# Patient Record
Sex: FEMALE
[De-identification: names, ages, dates, MRNs, and addresses within clinical notes are randomized per-mention and may not be internally consistent; named-entity substitution may affect disease eponyms.]

---

## 2017-09-05 ENCOUNTER — CHARTING TRANS (OUTPATIENT)
Dept: OTHER | Age: 22
End: 2017-09-05

## 2017-09-13 ENCOUNTER — CHARTING TRANS (OUTPATIENT)
Dept: OTHER | Age: 22
End: 2017-09-13

## 2018-01-10 ENCOUNTER — CHARTING TRANS (OUTPATIENT)
Dept: OTHER | Age: 23
End: 2018-01-10

## 2018-03-22 ENCOUNTER — OFFICE VISIT (OUTPATIENT)
Dept: FAMILY MEDICINE CLINIC | Facility: CLINIC | Age: 23
End: 2018-03-22

## 2018-03-22 VITALS
TEMPERATURE: 98 F | DIASTOLIC BLOOD PRESSURE: 76 MMHG | RESPIRATION RATE: 16 BRPM | BODY MASS INDEX: 37.52 KG/M2 | SYSTOLIC BLOOD PRESSURE: 112 MMHG | HEIGHT: 64 IN | WEIGHT: 219.81 LBS | HEART RATE: 80 BPM

## 2018-03-22 DIAGNOSIS — R63.5 WEIGHT GAIN, ABNORMAL: ICD-10-CM

## 2018-03-22 DIAGNOSIS — Z00.00 HEALTH MAINTENANCE EXAMINATION: ICD-10-CM

## 2018-03-22 DIAGNOSIS — J30.1 NON-SEASONAL ALLERGIC RHINITIS DUE TO POLLEN: Primary | ICD-10-CM

## 2018-03-22 PROBLEM — F41.9 ANXIETY: Status: ACTIVE | Noted: 2018-03-22

## 2018-03-22 PROCEDURE — 99385 PREV VISIT NEW AGE 18-39: CPT | Performed by: FAMILY MEDICINE

## 2018-03-22 RX ORDER — FLUOXETINE HYDROCHLORIDE 20 MG/1
20 CAPSULE ORAL DAILY
COMMUNITY
End: 2018-04-25

## 2018-03-22 NOTE — PROGRESS NOTES
[facility] [place]  PROGRESS NOTE        HPI:   This is a 20year old female coming in for Patient presents with:  Physical: New Patient    HPI:  Pt states that she is concerned with her mother being diagnosed with Hasimoto's disease and wanted List:     Anxiety     Non-seasonal allergic rhinitis due to pollen     Health maintenance examination      REVIEW OF SYSTEMS:   Review of Systems   Constitutional: Negative. HENT: Negative. Eyes: Negative. Respiratory: Negative.     Cardiovascular: thought content normal.   bdi: 7  bdi 19  phq9:  5       ASSESSMENT AND PLAN:   Artem Reno was seen today for physical.    Diagnoses and all orders for this visit:    Non-seasonal allergic rhinitis due to pollen  -     CK CREATINE KINASE (NOT CREATININE);  Fut Future  -     ADULT FOOD ALLERGY PROF; Future  -     ALLERGY REGION 8; Future          No problem-specific Assessment & Plan notes found for this encounter. future appointment:    No Follow-up on file.     Meds & Refills for this Visit:    No prescrip

## 2018-03-23 PROBLEM — Z87.42 HISTORY OF PCOS: Status: ACTIVE | Noted: 2018-03-23

## 2018-03-26 RX ORDER — FLUOXETINE 20 MG/1
TABLET, FILM COATED ORAL
Qty: 30 TABLET | Refills: 0 | Status: SHIPPED | OUTPATIENT
Start: 2018-03-26 | End: 2018-04-25

## 2018-03-26 NOTE — TELEPHONE ENCOUNTER
Future appt:     Your appointments     Date & Time Appointment Department [facility])    Mar 28, 2018  9:15 AM CDT Laboratory Visit with REF [facility] ([facility])        [facility]  [facility] [place]  [address] [facility]

## 2018-03-28 ENCOUNTER — LABORATORY ENCOUNTER (OUTPATIENT)
Dept: LAB | Age: 23
End: 2018-03-28
Attending: FAMILY MEDICINE
Payer: COMMERCIAL

## 2018-03-28 DIAGNOSIS — R63.5 WEIGHT GAIN, ABNORMAL: ICD-10-CM

## 2018-03-28 DIAGNOSIS — Z00.00 HEALTH MAINTENANCE EXAMINATION: ICD-10-CM

## 2018-03-28 DIAGNOSIS — J30.1 NON-SEASONAL ALLERGIC RHINITIS DUE TO POLLEN: ICD-10-CM

## 2018-03-28 LAB
25-HYDROXYVITAMIN D (TOTAL): 15.1 NG/ML (ref 30–100)
ALBUMIN SERPL-MCNC: 3.7 G/DL (ref 3.5–4.8)
ALP LIVER SERPL-CCNC: 61 U/L (ref 52–144)
ALT SERPL-CCNC: 26 U/L (ref 14–54)
ANTI-THYROGLOBULIN: 32 U/ML (ref ?–60)
ANTI-THYROPEROXIDASE: 29 U/ML (ref ?–60)
AST SERPL-CCNC: 17 U/L (ref 15–41)
BASOPHILS # BLD AUTO: 0.07 X10(3) UL (ref 0–0.1)
BASOPHILS NFR BLD AUTO: 0.7 %
BILIRUB SERPL-MCNC: 0.6 MG/DL (ref 0.1–2)
BILIRUB UR QL STRIP.AUTO: NEGATIVE
BUN BLD-MCNC: 17 MG/DL (ref 8–20)
CALCIUM BLD-MCNC: 9 MG/DL (ref 8.3–10.3)
CHLORIDE: 105 MMOL/L (ref 101–111)
CHOLEST SMN-MCNC: 167 MG/DL (ref ?–190)
CK: 82 IU/L (ref 26–192)
CLARITY UR REFRACT.AUTO: CLEAR
CO2: 27 MMOL/L (ref 22–32)
COLOR UR AUTO: YELLOW
CREAT BLD-MCNC: 0.83 MG/DL (ref 0.55–1.02)
DEPRECATED HBV CORE AB SER IA-ACNC: 56.5 NG/ML (ref 10–291)
EOSINOPHIL # BLD AUTO: 0.24 X10(3) UL (ref 0–0.3)
EOSINOPHIL NFR BLD AUTO: 2.4 %
ERYTHROCYTE [DISTWIDTH] IN BLOOD BY AUTOMATED COUNT: 11.6 % (ref 11.5–16)
GLUCOSE BLD-MCNC: 91 MG/DL (ref 70–99)
GLUCOSE UR STRIP.AUTO-MCNC: NEGATIVE MG/DL
HAV AB SERPL IA-ACNC: 769 PG/ML (ref 193–986)
HCT VFR BLD AUTO: 46.2 % (ref 34–50)
HDLC SERPL-MCNC: 44 MG/DL (ref 45–?)
HDLC SERPL: 3.8 {RATIO} (ref ?–4.44)
HGB BLD-MCNC: 16.1 G/DL (ref 12–16)
IMMATURE GRANULOCYTE COUNT: 0.03 X10(3) UL (ref 0–1)
IMMATURE GRANULOCYTE RATIO %: 0.3 %
IRON SATURATION: 43 % (ref 13–45)
IRON: 113 UG/DL (ref 28–170)
KETONES UR STRIP.AUTO-MCNC: NEGATIVE MG/DL
LDLC SERPL CALC-MCNC: 87 MG/DL (ref ?–120)
LEUKOCYTE ESTERASE UR QL STRIP.AUTO: NEGATIVE
LYMPHOCYTES # BLD AUTO: 2.35 X10(3) UL (ref 0.9–4)
LYMPHOCYTES NFR BLD AUTO: 23.8 %
M PROTEIN MFR SERPL ELPH: 7.4 G/DL (ref 6.1–8.3)
MCH RBC QN AUTO: 31 PG (ref 27–33.2)
MCHC RBC AUTO-ENTMCNC: 34.8 G/DL (ref 31–37)
MCV RBC AUTO: 88.8 FL (ref 81–100)
MONOCYTES # BLD AUTO: 0.67 X10(3) UL (ref 0.1–1)
MONOCYTES NFR BLD AUTO: 6.8 %
NEUTROPHIL ABS PRELIM: 6.5 X10 (3) UL (ref 1.3–6.7)
NEUTROPHILS # BLD AUTO: 6.5 X10(3) UL (ref 1.3–6.7)
NEUTROPHILS NFR BLD AUTO: 66 %
NITRITE UR QL STRIP.AUTO: NEGATIVE
NONHDLC SERPL-MCNC: 123 MG/DL (ref ?–150)
PH UR STRIP.AUTO: 6 [PH] (ref 4.5–8)
PLATELET # BLD AUTO: 286 10(3)UL (ref 150–450)
POTASSIUM SERPL-SCNC: 4.1 MMOL/L (ref 3.6–5.1)
PROT UR STRIP.AUTO-MCNC: NEGATIVE MG/DL
RBC # BLD AUTO: 5.2 X10(6)UL (ref 3.8–5.1)
RBC UR QL AUTO: NEGATIVE
RED CELL DISTRIBUTION WIDTH-SD: 36.9 FL (ref 35.1–46.3)
SODIUM SERPL-SCNC: 136 MMOL/L (ref 136–144)
SP GR UR STRIP.AUTO: 1.02 (ref 1–1.03)
TOTAL IRON BINDING CAPACITY: 261 UG/DL (ref 298–536)
TRANSFERRIN: 175 MG/DL (ref 200–360)
TRIGL SERPL-MCNC: 180 MG/DL (ref ?–115)
TSI SER-ACNC: 1.57 MIU/ML (ref 0.35–5.5)
URIC ACID: 4.1 MG/DL (ref 2.4–8)
UROBILINOGEN UR STRIP.AUTO-MCNC: <2 MG/DL
VLDLC SERPL CALC-MCNC: 36 MG/DL (ref 5–40)
WBC # BLD AUTO: 9.9 X10(3) UL (ref 4–13)

## 2018-03-28 PROCEDURE — 83550 IRON BINDING TEST: CPT | Performed by: FAMILY MEDICINE

## 2018-03-28 PROCEDURE — 82728 ASSAY OF FERRITIN: CPT | Performed by: FAMILY MEDICINE

## 2018-03-28 PROCEDURE — 80061 LIPID PANEL: CPT | Performed by: FAMILY MEDICINE

## 2018-03-28 PROCEDURE — 86003 ALLG SPEC IGE CRUDE XTRC EA: CPT | Performed by: FAMILY MEDICINE

## 2018-03-28 PROCEDURE — 80050 GENERAL HEALTH PANEL: CPT | Performed by: FAMILY MEDICINE

## 2018-03-28 PROCEDURE — 86376 MICROSOMAL ANTIBODY EACH: CPT | Performed by: FAMILY MEDICINE

## 2018-03-28 PROCEDURE — 36415 COLL VENOUS BLD VENIPUNCTURE: CPT | Performed by: FAMILY MEDICINE

## 2018-03-28 PROCEDURE — 86800 THYROGLOBULIN ANTIBODY: CPT | Performed by: FAMILY MEDICINE

## 2018-03-28 PROCEDURE — 82550 ASSAY OF CK (CPK): CPT | Performed by: FAMILY MEDICINE

## 2018-03-28 PROCEDURE — 82607 VITAMIN B-12: CPT | Performed by: FAMILY MEDICINE

## 2018-03-28 PROCEDURE — 82785 ASSAY OF IGE: CPT | Performed by: FAMILY MEDICINE

## 2018-03-28 PROCEDURE — 81003 URINALYSIS AUTO W/O SCOPE: CPT | Performed by: FAMILY MEDICINE

## 2018-03-28 PROCEDURE — 84550 ASSAY OF BLOOD/URIC ACID: CPT | Performed by: FAMILY MEDICINE

## 2018-03-28 PROCEDURE — 82306 VITAMIN D 25 HYDROXY: CPT | Performed by: FAMILY MEDICINE

## 2018-03-28 PROCEDURE — 83540 ASSAY OF IRON: CPT | Performed by: FAMILY MEDICINE

## 2018-03-29 ENCOUNTER — TELEPHONE (OUTPATIENT)
Dept: FAMILY MEDICINE CLINIC | Facility: CLINIC | Age: 23
End: 2018-03-29

## 2018-03-29 DIAGNOSIS — E55.9 VITAMIN D DEFICIENCY: Primary | ICD-10-CM

## 2018-03-29 RX ORDER — ERGOCALCIFEROL 1.25 MG/1
50000 CAPSULE ORAL WEEKLY
Qty: 12 CAPSULE | Refills: 0 | Status: SHIPPED | OUTPATIENT
Start: 2018-03-29 | End: 2018-06-15

## 2018-03-29 NOTE — TELEPHONE ENCOUNTER
----- Message from Alexandre Alejandro MD sent at 3/29/2018  9:35 AM CDT -----  Vitamin D is very low,   Recommend 46200 units of vitamin D weekly x 12 weeks. Recheck vitamin D level in 3 months. Iron levels are ok.    Triglycerides are high,   Recommend inc

## 2018-03-29 NOTE — TELEPHONE ENCOUNTER
Patient notified of results and recommendations and expressed understanding.   Script to [employer] per patient request  Order in for recheck vitamin d level    Ron Rosenthal, 03/29/18, 10:59 AM

## 2018-03-31 ENCOUNTER — TELEPHONE (OUTPATIENT)
Dept: FAMILY MEDICINE CLINIC | Facility: CLINIC | Age: 23
End: 2018-03-31

## 2018-03-31 LAB
ALLERGEN,  IGE: <0.1 KU/L
ALLERGEN,  SHRIMP IGE: <0.1 KU/L
ALLERGEN,  TREE IGE: <0.1 KU/L
ALLERGEN, A.ALTERNATA(TENUIS): <0.1 KU/L
ALLERGEN, BERMUDA GRASS IGE: <0.1 KU/L
ALLERGEN, BOX ELDER/MAPLE IGE: <0.1 KU/L
ALLERGEN, CAT DANDER IGE: <0.1 KU/L
ALLERGEN, CLAM IGE: <0.1 KU/L
ALLERGEN, CODFISH: <0.1 KU/L
ALLERGEN, CORN IGE: <0.1 KU/L
ALLERGEN, COTTONWOOD IGE: <0.1 KU/L
ALLERGEN, D. FARINAE IGE: <0.1 KU/L
ALLERGEN, D.PTERONYSSINUS IGE: <0.1 KU/L
ALLERGEN, DOG DANDER IGE: <0.1 KU/L
ALLERGEN, EGG WHITE IGE: <0.1 KU/L
ALLERGEN, GERMAN COCKROACH IGE: <0.1 KU/L
ALLERGEN, HORMODENDRUM IGE: <0.1 KU/L
ALLERGEN, MARSH ELDER IGE: <0.1 KU/L
ALLERGEN, MILK (COW) IGE: <0.1 KU/L
ALLERGEN, MILK (COW) IGE: <0.1 KU/L
ALLERGEN, MOUNTAIN CEDAR IGE: <0.1 KU/L
ALLERGEN, MOUSE EPITHE IGE: <0.1 KU/L
ALLERGEN, MUCOR RACEMOSUS IGE: <0.1 KU/L
ALLERGEN, OAK TREE IGE: <0.1 KU/L
ALLERGEN, PEANUT IGE: <0.1 KU/L
ALLERGEN, PEANUT IGE: <0.1 KU/L
ALLERGEN, PECAN TREE IGE: <0.1 KU/L
ALLERGEN, PENICILLIUM NOTATUM: <0.1 KU/L
ALLERGEN, PIGWEED IGE: <0.1 KU/L
ALLERGEN, RUSSIAN THISTLE IGE: <0.1 KU/L
ALLERGEN, SCALLOP IGE: <0.1 KU/L
ALLERGEN, SHORT RAGWEED IGE: <0.1 KU/L
ALLERGEN, SOYBEAN IGE: <0.1 KU/L
ALLERGEN, TIMOTHY GRASS IGE: <0.1 KU/L
ALLERGEN, WALNUT TREE IGE: <0.1 KU/L
ALLERGEN, WALNUT/BLACK WALNUT: <0.1 KU/L
ALLERGEN, WHEAT IGE: <0.1 KU/L
ALLERGEN, WHITE ASH IGE: <0.1 KU/L
ALLERGEN, WHITE MULBERRY IGE: <0.1 KU/L
ALLERGEN,ASPERGILLUS FUMIGATUS: <0.1 KU/L
IMMUNOGLOBULIN E: 4 KU/L
IMMUNOGLOBULIN E: 4 KU/L

## 2018-03-31 NOTE — TELEPHONE ENCOUNTER
Patient notified of results and recommendations and expressed understanding.     Omari Rosenthal, 03/31/18, 9:56 AM

## 2018-03-31 NOTE — TELEPHONE ENCOUNTER
----- Message from John Hanley MD sent at 3/31/2018  7:52 AM CDT -----  Normal () results, please notify patient. Mychart message sent if possible as well.

## 2018-04-02 ENCOUNTER — PATIENT MESSAGE (OUTPATIENT)
Dept: FAMILY MEDICINE CLINIC | Facility: CLINIC | Age: 23
End: 2018-04-02

## 2018-04-03 NOTE — TELEPHONE ENCOUNTER
From: Iam Norton  To:  Tiana De La Paz MD  Sent: 4/2/2018 7:00 PM CDT  Subject: Prescription Question    I have a question about VITAMIN D, 25-HYDROXY resulted on 3/28/18, 5:28 PM.    I have the Rx D2 that is to be taken once weekly, should I also take

## 2018-04-24 ENCOUNTER — PATIENT MESSAGE (OUTPATIENT)
Dept: FAMILY MEDICINE CLINIC | Facility: CLINIC | Age: 23
End: 2018-04-24

## 2018-04-25 ENCOUNTER — OFFICE VISIT (OUTPATIENT)
Dept: FAMILY MEDICINE CLINIC | Facility: CLINIC | Age: 23
End: 2018-04-25

## 2018-04-25 VITALS
BODY MASS INDEX: 37.73 KG/M2 | WEIGHT: 221 LBS | RESPIRATION RATE: 16 BRPM | DIASTOLIC BLOOD PRESSURE: 84 MMHG | HEIGHT: 64 IN | HEART RATE: 84 BPM | SYSTOLIC BLOOD PRESSURE: 110 MMHG | TEMPERATURE: 98 F

## 2018-04-25 DIAGNOSIS — Z30.019 ENCOUNTER FOR FEMALE BIRTH CONTROL: Primary | ICD-10-CM

## 2018-04-25 PROCEDURE — 99214 OFFICE O/P EST MOD 30 MIN: CPT | Performed by: NURSE PRACTITIONER

## 2018-04-25 RX ORDER — NORGESTIMATE AND ETHINYL ESTRADIOL 7DAYSX3 28
1 KIT ORAL DAILY
Qty: 3 PACKAGE | Refills: 3 | Status: SHIPPED | OUTPATIENT
Start: 2018-04-25 | End: 2019-02-25 | Stop reason: ALTCHOICE

## 2018-04-25 RX ORDER — FLUOXETINE 20 MG/1
TABLET, FILM COATED ORAL
Qty: 90 TABLET | Refills: 1 | Status: SHIPPED | OUTPATIENT
Start: 2018-04-25 | End: 2018-12-17

## 2018-04-25 NOTE — PATIENT INSTRUCTIONS
Start ortho tricyclen on May 13th. Call if any problems or complications. Handout on birth control given. Recheck in September for pelvic exam and pap. Otherwise follow-up as needed.

## 2018-04-25 NOTE — PROGRESS NOTES
HPI:    Patient ID: Marcial Latif is a 20year old female. HPI    Patient is present with requests to start birth control. States that she is getting  August 15. Would like to start and be controlled on birth control prior to that.   She has n mass index is 37.93 kg/m². ASSESSMENT/PLAN:   Encounter for female birth control  (primary encounter diagnosis)    No orders of the defined types were placed in this encounter.       Meds This Visit:  Signed Prescriptions Disp Refills    Norgestim-E

## 2018-04-25 NOTE — TELEPHONE ENCOUNTER
Future appt:    Last Appointment:  Patient saw Christy Fish this morning    Cholesterol, Total (mg/dL)   Date Value   03/28/2018 167   ----------  HDL Cholesterol (mg/dL)   Date Value   03/28/2018 44 (L)   ----------  LDL Cholesterol (mg/dL)   Date Value   03/28/

## 2018-04-25 NOTE — TELEPHONE ENCOUNTER
From: Fabricio Tamez  To: Martir Gonzalez MD  Sent: 4/24/2018 9:13 PM CDT  Subject: Other    Hello Dr. James Laura,    I will be getting  in August of this year and I am currently not on birth control.  I would like to be on birth control before I get [facility]

## 2018-05-01 ENCOUNTER — PATIENT MESSAGE (OUTPATIENT)
Dept: FAMILY MEDICINE CLINIC | Facility: CLINIC | Age: 23
End: 2018-05-01

## 2018-05-02 NOTE — TELEPHONE ENCOUNTER
From: iNna Abdul  To: MARVEL Paez  Sent: 5/1/2018 4:28 PM CDT  Subject: Non-Urgent Medical Question    Hello,    I have a concerning rash on my back.  There's one spot about an inch above my bra strap that is feels very raw and it is very red

## 2018-07-03 ENCOUNTER — PATIENT MESSAGE (OUTPATIENT)
Dept: FAMILY MEDICINE CLINIC | Facility: CLINIC | Age: 23
End: 2018-07-03

## 2018-07-03 DIAGNOSIS — N94.6 DYSMENORRHEA: Primary | ICD-10-CM

## 2018-07-03 NOTE — TELEPHONE ENCOUNTER
From: Nina Abdul  To: Juana Price MD  Sent: 7/3/2018 9:18 AM CDT  Subject: Visit Susan Camilo,    It has been about 2 weeks since I finished my vitamin D prescription.  Do I need to make a follow-up appointment for the blood

## 2018-07-03 NOTE — TELEPHONE ENCOUNTER
Marek Maher 4/25/18 to discuss birth control  Patient saw Dr. Karla Muller on 3/22/18 for physical  Patient states at physical appt with Dr. Karla Muller it was discussed that she may have PCOS  Patient would like to know if any additional testing can be done to confirm [facility]

## 2018-07-27 ENCOUNTER — APPOINTMENT (OUTPATIENT)
Dept: LAB | Age: 23
End: 2018-07-27
Attending: FAMILY MEDICINE
Payer: COMMERCIAL

## 2018-07-27 DIAGNOSIS — N94.6 DYSMENORRHEA: ICD-10-CM

## 2018-07-27 DIAGNOSIS — E55.9 VITAMIN D DEFICIENCY: ICD-10-CM

## 2018-07-27 LAB
FSH SERPL-ACNC: 2.2 MIU/ML
LH: 2.8 MIU/ML
PROGESTERONE: 0.94 NG/ML
PROLACTIN: 12.3 NG/ML
VIT D+METAB SERPL-MCNC: 34.6 NG/ML (ref 30–100)

## 2018-07-27 PROCEDURE — 83002 ASSAY OF GONADOTROPIN (LH): CPT | Performed by: FAMILY MEDICINE

## 2018-07-27 PROCEDURE — 84402 ASSAY OF FREE TESTOSTERONE: CPT | Performed by: FAMILY MEDICINE

## 2018-07-27 PROCEDURE — 36415 COLL VENOUS BLD VENIPUNCTURE: CPT | Performed by: FAMILY MEDICINE

## 2018-07-27 PROCEDURE — 84403 ASSAY OF TOTAL TESTOSTERONE: CPT | Performed by: FAMILY MEDICINE

## 2018-07-27 PROCEDURE — 82306 VITAMIN D 25 HYDROXY: CPT | Performed by: FAMILY MEDICINE

## 2018-07-27 PROCEDURE — 82627 DEHYDROEPIANDROSTERONE: CPT | Performed by: FAMILY MEDICINE

## 2018-07-27 PROCEDURE — 84146 ASSAY OF PROLACTIN: CPT | Performed by: FAMILY MEDICINE

## 2018-07-27 PROCEDURE — 84144 ASSAY OF PROGESTERONE: CPT | Performed by: FAMILY MEDICINE

## 2018-07-27 PROCEDURE — 83001 ASSAY OF GONADOTROPIN (FSH): CPT | Performed by: FAMILY MEDICINE

## 2018-07-27 PROCEDURE — 82671 ASSAY OF ESTROGENS: CPT | Performed by: FAMILY MEDICINE

## 2018-07-29 LAB — DHEA SULFATE, SERUM: 200 UG/DL

## 2018-07-31 LAB
SEX HORMONE BINDING GLOBULIN: 188 NMOL/L
TESTOSTERONE -MS, BIOAVAILAB: 2.6 NG/DL
TESTOSTERONE, -MS/MS: 23 NG/DL
TESTOSTERONE, FREE -MS/MS: 1.1 PG/ML

## 2018-08-01 ENCOUNTER — TELEPHONE (OUTPATIENT)
Dept: FAMILY MEDICINE CLINIC | Facility: CLINIC | Age: 23
End: 2018-08-01

## 2018-08-01 LAB
ESTRADIOL BY TMS: 9.3 PG/ML
ESTROGENS TOTAL CALCULATION: 23 PG/ML
ESTRONE BY TMS: 13.7 PG/ML

## 2018-08-01 NOTE — TELEPHONE ENCOUNTER
----- Message from Bebo Vences MD sent at 8/1/2018 12:24 AM CDT -----  Hormone levels seem mostly normal.

## 2018-08-03 ENCOUNTER — OFFICE VISIT (OUTPATIENT)
Dept: FAMILY MEDICINE CLINIC | Facility: CLINIC | Age: 23
End: 2018-08-03
Payer: COMMERCIAL

## 2018-08-03 VITALS
HEIGHT: 64 IN | DIASTOLIC BLOOD PRESSURE: 72 MMHG | OXYGEN SATURATION: 98 % | BODY MASS INDEX: 38.48 KG/M2 | WEIGHT: 225.38 LBS | HEART RATE: 79 BPM | TEMPERATURE: 98 F | RESPIRATION RATE: 18 BRPM | SYSTOLIC BLOOD PRESSURE: 116 MMHG

## 2018-08-03 DIAGNOSIS — Z87.42 HISTORY OF PCOS: Primary | ICD-10-CM

## 2018-08-03 PROCEDURE — 99214 OFFICE O/P EST MOD 30 MIN: CPT | Performed by: FAMILY MEDICINE

## 2018-08-03 NOTE — PATIENT INSTRUCTIONS
History question of PCOS  -     US PELVIS EV (TRNS ABD AND ENDOVAG) ([id number]); Future   Follow up for evaluation of sleep. Schedule referral to weight loss clinic  as pt desires. Continue diet and exercise to weight loss.

## 2018-08-03 NOTE — PROGRESS NOTES
[facility] [place]  PROGRESS NOTE        HPI:   This is a 24year old female coming in for Patient presents with: Follow - Up: test results and pcos    HPI  Discussed the effect of suspected PCOS  On her life.   States the biggest problem at the and getting her doctorate. Read and videogames, hiking. Pt states her mother's side of the family has a history of PCOS, but unsure which members.        Smoking status: Never Smoker                                                              S /72 (BP Location: Left arm, Patient Position: Sitting, Cuff Size: adult)   Pulse 79   Temp 97.8 °F (36.6 °C) (Temporal)   Resp 18   Ht 64\"   Wt 225 lb 6.4 oz   LMP 07/31/2018   SpO2 98%   BMI 38.69 kg/m²  Estimated body mass index is 38.69 kg/m² a Aug 03, 2018  2:00 PM CDT Follow up Visit with Walter Veliz MD [address] [place] [address] ([facility])    Please arrive 15 minutes prior to your scheduled appointment.  Please also bring your Insurance card, Photo I

## 2018-08-28 ENCOUNTER — OFFICE VISIT (OUTPATIENT)
Dept: FAMILY MEDICINE CLINIC | Facility: CLINIC | Age: 23
End: 2018-08-28
Payer: COMMERCIAL

## 2018-08-28 VITALS
WEIGHT: 227 LBS | SYSTOLIC BLOOD PRESSURE: 118 MMHG | BODY MASS INDEX: 38.76 KG/M2 | HEART RATE: 80 BPM | HEIGHT: 64 IN | RESPIRATION RATE: 18 BRPM | TEMPERATURE: 98 F | DIASTOLIC BLOOD PRESSURE: 70 MMHG

## 2018-08-28 DIAGNOSIS — G47.50 PARASOMNIA: ICD-10-CM

## 2018-08-28 DIAGNOSIS — R44.2 HYPNAPOMPIC HALLUCINATIONS: Primary | ICD-10-CM

## 2018-08-28 DIAGNOSIS — F51.8 HYPNAGOGIC JERKS: ICD-10-CM

## 2018-08-28 DIAGNOSIS — G47.10 HYPERSOMNIA: ICD-10-CM

## 2018-08-28 DIAGNOSIS — R06.83 SNORING: ICD-10-CM

## 2018-08-28 PROCEDURE — 99214 OFFICE O/P EST MOD 30 MIN: CPT | Performed by: FAMILY MEDICINE

## 2018-08-28 NOTE — PROGRESS NOTES
[facility]  [facility]        HPI:   This is a 24year old female coming in for Patient presents with:  Consult: sleep       HPI:  Patient is here for an evaluation of her sleep Pt states she is tired, and she can tell that she falling asleep and has periods where she feels like she is falling and then feels as though she is startled by the fall but hasn't moved from her bed. Patient: Sleep review of systems today: see above, others reported negative, see form.      Patient is Thyroid disease Maternal Grandfather    • ibs [OTHER] Paternal Grandmother    • Ulcerative Colitis Paternal Grandfather      Allergies:    Amoxicillin             HIVES  Current Meds:    Current Outpatient Prescriptions:  Norgestim-Eth Estrad Triphasic (OR 118/70  08/03/18 : 116/72  04/25/18 : 110/84    Ideal body weight: 120 lb 9.5 oz  Adjusted ideal body weight: 163 lb 2.5 oz    Vital signs reviewed. Physical Exam   Constitutional: She is oriented to person, place, and time.  She appears well-developed and treatments as a result of today.        Melody Dalton MD  8/28/2018  2:00 PM

## 2018-08-28 NOTE — PATIENT INSTRUCTIONS
You have been scheduled for a sleep study at the [address] [place] [address]. You will receive a phone call from the Sleep lab to review forms and will receive forms to fill out in the mail.      You should have heard from our precert department within the next

## 2018-08-30 ENCOUNTER — TELEPHONE (OUTPATIENT)
Dept: FAMILY MEDICINE CLINIC | Facility: CLINIC | Age: 23
End: 2018-08-30

## 2018-08-30 ENCOUNTER — HOSPITAL ENCOUNTER (OUTPATIENT)
Dept: ULTRASOUND IMAGING | Age: 23
Discharge: HOME | End: 2018-08-30
Attending: FAMILY MEDICINE
Payer: COMMERCIAL

## 2018-08-30 DIAGNOSIS — Z87.42 HISTORY OF PCOS: ICD-10-CM

## 2018-08-30 PROCEDURE — 76856 US EXAM PELVIC COMPLETE: CPT | Performed by: FAMILY MEDICINE

## 2018-08-30 PROCEDURE — 76830 TRANSVAGINAL US NON-OB: CPT | Performed by: FAMILY MEDICINE

## 2018-08-30 NOTE — TELEPHONE ENCOUNTER
----- Message from Zora White MD sent at 8/30/2018  1:21 PM CDT -----  Pt with complex cyst in ovary. Recommend repeat US in 2 cycles. Recommend ibuprofen as needed for pain.

## 2018-08-30 NOTE — TELEPHONE ENCOUNTER
Patient informed of the below results and recommendations. Patient will c/b to schedule the u/s after 2 cycles. Please place order.

## 2018-09-07 ENCOUNTER — OFFICE VISIT (OUTPATIENT)
Dept: SLEEP CENTER | Age: 23
End: 2018-09-07
Attending: FAMILY MEDICINE
Payer: COMMERCIAL

## 2018-09-07 PROCEDURE — 95810 POLYSOM 6/> YRS 4/> PARAM: CPT

## 2018-09-14 ENCOUNTER — TELEPHONE (OUTPATIENT)
Dept: FAMILY MEDICINE CLINIC | Facility: CLINIC | Age: 23
End: 2018-09-14

## 2018-09-14 NOTE — PROCEDURES
[address] [address],[facility] [address]       Accredited by the [facility] of Sleep Medicine ([facility])    PATIENT'S NAME:        Master Ortiz  ATTENDING PHYSICIAN:   Latisha Mcmahon.  MD Priti  REFERRING PHYSICIAN:     PATIENT ACCOUNT #:     [id number] She experienced RERAs with a RDI of 4.9. She did have snoring, with a snoring index of 0.9. Her minimum oxygen saturation was 89.2. Her maximum oxygen saturation was 99.6. Her average oxygen saturation was 95.8%.       LIMB ACTIVITY:  There were 53 limb Patient Medical History: mood disorder, parasomnia, snoring, hypersomnia, migraines, ear infections, anxiety   Setup Time began:   8:30 pm  Setup Time ended:    9:00 pm  Lights Out:    9:36 pm  Respiratory Events: reras, hy Stage By Position     NREM REM Supine Non-Supine TOTAL   Sleep Time (min) 366.0 83.0 265.5 183.5 449.0   % Sleep Time 81.5% 18.5% 59.1% 40.9% 100.0%   Obstructive Apnea 3 1 4 - 4   Mixed Apnea - - - - -   Central Apnea - - - - -   All Apneas 3 1 4 - 4   Ob

## 2018-09-14 NOTE — TELEPHONE ENCOUNTER
Your appointments     Date & Time Appointment Department [facility])    Sep 17, 2018  4:30 PM CDT Sleep Follow Up with MARVEL Billy [address] [facility], [facility] ([facility])        [address] [facility]

## 2018-09-14 NOTE — TELEPHONE ENCOUNTER
----- Message from Chung Laguerre MD sent at 9/14/2018 11:56 AM CDT -----  Please update study flow sheet,   notify patient,   Have follow up scheduled.

## 2018-09-17 ENCOUNTER — OFFICE VISIT (OUTPATIENT)
Dept: FAMILY MEDICINE CLINIC | Facility: CLINIC | Age: 23
End: 2018-09-17
Payer: COMMERCIAL

## 2018-09-17 VITALS
DIASTOLIC BLOOD PRESSURE: 78 MMHG | HEIGHT: 64 IN | SYSTOLIC BLOOD PRESSURE: 122 MMHG | HEART RATE: 68 BPM | WEIGHT: 224.38 LBS | TEMPERATURE: 99 F | BODY MASS INDEX: 38.31 KG/M2 | RESPIRATION RATE: 20 BRPM

## 2018-09-17 DIAGNOSIS — R42 DIZZINESS: ICD-10-CM

## 2018-09-17 DIAGNOSIS — R06.83 SNORING: Primary | ICD-10-CM

## 2018-09-17 PROBLEM — G43.809 VESTIBULAR MIGRAINE: Status: ACTIVE | Noted: 2017-11-17

## 2018-09-17 PROCEDURE — 99213 OFFICE O/P EST LOW 20 MIN: CPT | Performed by: NURSE PRACTITIONER

## 2018-09-17 NOTE — PROGRESS NOTES
[facility]        HPI:   This is a 24year old female coming in for No chief complaint on file. HPI:   Patient is present to review her sleep study. States that she has seen the results online.  Reviewed the isabel Read and videogames, hiking. Pt states her mother's side of the family has a history of PCOS, but unsure which members. Social History    Tobacco Use      Smoking status: Never Smoker      Smokeless tobacco: Never Used    Alcohol use:  Mya Murphy Weight as of this encounter: 224 lb 6.4 oz. Neck in inches:       Wt Readings from Last 6 Encounters:  09/17/18 : 224 lb 6.4 oz  08/28/18 : 227 lb  08/03/18 : 225 lb 6.4 oz  04/25/18 : 221 lb  03/22/18 : 219 lb 12.8 oz    BP Readings from Last 3 Encount CPAP mask and humidifier  chamber changed every 6 month  with the Durable medical equipment provider.          Meds & Refills for this Visit:  Requested Prescriptions      No prescriptions requested or ordered in this encounter       Outcome: Parent verbali

## 2018-09-17 NOTE — PATIENT INSTRUCTIONS
Let us know if wants referral to dentist, Dr. Yasmin Ratliff, for an oral appliance. Given brochure on sleep hygiene. Take an antihistamine daily. Return to clinic if dizziness not improving. Otherwise follow-up as needed.

## 2018-11-02 VITALS
HEIGHT: 62 IN | DIASTOLIC BLOOD PRESSURE: 66 MMHG | BODY MASS INDEX: 39.38 KG/M2 | TEMPERATURE: 98.3 F | SYSTOLIC BLOOD PRESSURE: 108 MMHG | HEART RATE: 84 BPM | WEIGHT: 214 LBS

## 2018-11-03 VITALS — TEMPERATURE: 98 F

## 2018-11-03 VITALS — TEMPERATURE: 98.3 F

## 2018-11-29 ENCOUNTER — PATIENT MESSAGE (OUTPATIENT)
Dept: FAMILY MEDICINE CLINIC | Facility: CLINIC | Age: 23
End: 2018-11-29

## 2018-11-30 NOTE — TELEPHONE ENCOUNTER
From: Trevor Cosme  To: Alexander Miller MD  Sent: 11/29/2018 10:42 PM CST  Subject: Non-Urgent [place],    I don't know if my fluoxetine medication is very effective or I may be experiencing some of the negative side effects.  I

## 2018-12-05 NOTE — PATIENT INSTRUCTIONS
Stop the birth control. Aware that her menses may come early. Monitor mood. If improved, call in a few weeks. If not improved, schedule appointment to discuss more options. Patient in agreement with plan.

## 2018-12-17 RX ORDER — FLUOXETINE 20 MG/1
TABLET, FILM COATED ORAL
Qty: 90 TABLET | Refills: 1 | Status: SHIPPED | OUTPATIENT
Start: 2018-12-17

## 2018-12-17 NOTE — TELEPHONE ENCOUNTER
RF request from Esdras Burris for Fluoxetine 20 MG. Please advise.      Future appt:    Last Appointment:  12/4/2018; No f/u recommended    Cholesterol, Total (mg/dL)   Date Value   03/28/2018 167     HDL Cholesterol (mg/dL)   Date Value   03/28/2018 44 (

## 2019-01-11 ENCOUNTER — WALK IN (OUTPATIENT)
Dept: URGENT CARE | Age: 24
End: 2019-01-11

## 2019-01-11 VITALS
BODY MASS INDEX: 42.8 KG/M2 | DIASTOLIC BLOOD PRESSURE: 80 MMHG | OXYGEN SATURATION: 98 % | HEART RATE: 98 BPM | RESPIRATION RATE: 16 BRPM | SYSTOLIC BLOOD PRESSURE: 122 MMHG | HEIGHT: 62 IN | TEMPERATURE: 98.4 F | WEIGHT: 232.59 LBS

## 2019-01-11 DIAGNOSIS — H57.12 LEFT EYE PAIN: Primary | ICD-10-CM

## 2019-01-11 PROCEDURE — X1094 NO CHARGE VISIT: HCPCS | Performed by: NURSE PRACTITIONER

## 2019-01-11 ASSESSMENT — VISUAL ACUITY
OS_CC: 20/25
OD_CC: 20/25

## 2019-01-15 ENCOUNTER — PATIENT MESSAGE (OUTPATIENT)
Dept: FAMILY MEDICINE CLINIC | Facility: CLINIC | Age: 24
End: 2019-01-15

## 2019-01-16 NOTE — TELEPHONE ENCOUNTER
From: Isabella Mcarthur  To: IRIS Kirkpatrick  Sent: 1/15/2019 5:13 PM CST  Subject: Visit Follow-up Question    Hello,    This is just a follow-up to my visit on December 4, 2018. I think that going off the birth control has helped quite a lot.  I have

## 2019-02-25 ENCOUNTER — OFFICE VISIT (OUTPATIENT)
Dept: FAMILY MEDICINE CLINIC | Facility: CLINIC | Age: 24
End: 2019-02-25
Payer: COMMERCIAL

## 2019-02-25 ENCOUNTER — APPOINTMENT (OUTPATIENT)
Dept: LAB | Age: 24
End: 2019-02-25
Attending: NURSE PRACTITIONER
Payer: COMMERCIAL

## 2019-02-25 VITALS
OXYGEN SATURATION: 98 % | HEIGHT: 64 IN | WEIGHT: 230.63 LBS | BODY MASS INDEX: 39.37 KG/M2 | HEART RATE: 94 BPM | RESPIRATION RATE: 16 BRPM | TEMPERATURE: 98 F | SYSTOLIC BLOOD PRESSURE: 118 MMHG | DIASTOLIC BLOOD PRESSURE: 68 MMHG

## 2019-02-25 DIAGNOSIS — L24.9 IRRITANT CONTACT DERMATITIS, UNSPECIFIED TRIGGER: Primary | ICD-10-CM

## 2019-02-25 LAB
ALBUMIN SERPL-MCNC: 3.8 G/DL (ref 3.4–5)
ALBUMIN/GLOB SERPL: 1 {RATIO} (ref 1–2)
ALP LIVER SERPL-CCNC: 78 U/L (ref 37–98)
ALT SERPL-CCNC: 26 U/L (ref 13–56)
ANION GAP SERPL CALC-SCNC: 5 MMOL/L (ref 0–18)
AST SERPL-CCNC: 19 U/L (ref 15–37)
BASOPHILS # BLD AUTO: 0.06 X10(3) UL (ref 0–0.2)
BASOPHILS NFR BLD AUTO: 0.7 %
BILIRUB SERPL-MCNC: 0.4 MG/DL (ref 0.1–2)
BUN BLD-MCNC: 17 MG/DL (ref 7–18)
BUN/CREAT SERPL: 19.3 (ref 10–20)
CALCIUM BLD-MCNC: 9.4 MG/DL (ref 8.5–10.1)
CHLORIDE SERPL-SCNC: 108 MMOL/L (ref 98–107)
CO2 SERPL-SCNC: 26 MMOL/L (ref 21–32)
CREAT BLD-MCNC: 0.88 MG/DL (ref 0.55–1.02)
DEPRECATED RDW RBC AUTO: 39.7 FL (ref 35.1–46.3)
EOSINOPHIL # BLD AUTO: 0.21 X10(3) UL (ref 0–0.7)
EOSINOPHIL NFR BLD AUTO: 2.5 %
ERYTHROCYTE [DISTWIDTH] IN BLOOD BY AUTOMATED COUNT: 11.9 % (ref 11–15)
GLOBULIN PLAS-MCNC: 3.8 G/DL (ref 2.8–4.4)
GLUCOSE BLD-MCNC: 86 MG/DL (ref 70–99)
HCT VFR BLD AUTO: 48 % (ref 35–48)
HGB BLD-MCNC: 16.3 G/DL (ref 12–16)
IMM GRANULOCYTES # BLD AUTO: 0.02 X10(3) UL (ref 0–1)
IMM GRANULOCYTES NFR BLD: 0.2 %
IMMUNOGLOBULIN E: 4.1 IU/ML (ref 3.6–114)
LYMPHOCYTES # BLD AUTO: 1.6 X10(3) UL (ref 1–4)
LYMPHOCYTES NFR BLD AUTO: 18.9 %
M PROTEIN MFR SERPL ELPH: 7.6 G/DL (ref 6.4–8.2)
MCH RBC QN AUTO: 30.8 PG (ref 26–34)
MCHC RBC AUTO-ENTMCNC: 34 G/DL (ref 31–37)
MCV RBC AUTO: 90.7 FL (ref 80–100)
MONOCYTES # BLD AUTO: 0.57 X10(3) UL (ref 0.1–1)
MONOCYTES NFR BLD AUTO: 6.7 %
NEUTROPHILS # BLD AUTO: 5.99 X10 (3) UL (ref 1.5–7.7)
NEUTROPHILS # BLD AUTO: 5.99 X10(3) UL (ref 1.5–7.7)
NEUTROPHILS NFR BLD AUTO: 71 %
OSMOLALITY SERPL CALC.SUM OF ELEC: 289 MOSM/KG (ref 275–295)
PLATELET # BLD AUTO: 336 10(3)UL (ref 150–450)
POTASSIUM SERPL-SCNC: 4.8 MMOL/L (ref 3.5–5.1)
RBC # BLD AUTO: 5.29 X10(6)UL (ref 3.8–5.3)
SODIUM SERPL-SCNC: 139 MMOL/L (ref 136–145)
WBC # BLD AUTO: 8.5 X10(3) UL (ref 4–11)

## 2019-02-25 PROCEDURE — 82785 ASSAY OF IGE: CPT | Performed by: NURSE PRACTITIONER

## 2019-02-25 PROCEDURE — 99213 OFFICE O/P EST LOW 20 MIN: CPT | Performed by: NURSE PRACTITIONER

## 2019-02-25 PROCEDURE — 36415 COLL VENOUS BLD VENIPUNCTURE: CPT | Performed by: NURSE PRACTITIONER

## 2019-02-25 PROCEDURE — 85025 COMPLETE CBC W/AUTO DIFF WBC: CPT | Performed by: NURSE PRACTITIONER

## 2019-02-25 PROCEDURE — 80053 COMPREHEN METABOLIC PANEL: CPT | Performed by: NURSE PRACTITIONER

## 2019-02-25 RX ORDER — MULTIVITAMIN
1 TABLET ORAL DAILY
COMMUNITY

## 2019-02-25 RX ORDER — MULTIVIT-MIN/IRON/FOLIC ACID/K 18-600-40
1 CAPSULE ORAL DAILY
COMMUNITY

## 2019-02-25 RX ORDER — CLOTRIMAZOLE AND BETAMETHASONE DIPROPIONATE 10; .64 MG/G; MG/G
1 CREAM TOPICAL 2 TIMES DAILY
Qty: 60 G | Refills: 0 | Status: SHIPPED | OUTPATIENT
Start: 2019-02-25

## 2019-02-25 NOTE — PROGRESS NOTES
HPI:    Patient ID: Lizet Soto is a 24year old female. HPI     Patient is present with complaints of rash to the urena aspect of her hands. Started several months ago. Comes and goes for about 2 weeks - duration changes.    States that it started Eyes: Conjunctivae are normal.   Neck: Normal range of motion. Neck supple. Musculoskeletal: Normal range of motion. Neurological: She is alert and oriented to person, place, and time. Skin: Skin is warm, dry and intact. Rash noted.  Rash is urticar

## 2019-02-25 NOTE — PATIENT INSTRUCTIONS
Labs pending   Keep a log of chemicals and/or foods that hands come in contact with. Let me know if the rash worsens - would consider a course of steroids. Use cream until resolved. Otherwise follow-up as needed.

## 2019-09-09 ENCOUNTER — TELEPHONE (OUTPATIENT)
Dept: FAMILY MEDICINE CLINIC | Facility: CLINIC | Age: 24
End: 2019-09-09

## 2019-09-09 NOTE — TELEPHONE ENCOUNTER
Patient is due for a physical.  Patient's last physical was on 4/25/2018 with Jacqueline GONZALEZ for patient to return call.

## (undated) NOTE — LETTER
9/23/2019  [address] [address]    We take each of our patient's health very seriously and the key to maintaining your health is an annual wellness physical.  Review of your medical records shows that it is time for yo